# Patient Record
Sex: FEMALE | Race: OTHER | NOT HISPANIC OR LATINO | Employment: FULL TIME | URBAN - METROPOLITAN AREA
[De-identification: names, ages, dates, MRNs, and addresses within clinical notes are randomized per-mention and may not be internally consistent; named-entity substitution may affect disease eponyms.]

---

## 2021-10-13 ENCOUNTER — HISTORICAL (OUTPATIENT)
Dept: ADMINISTRATIVE | Facility: HOSPITAL | Age: 46
End: 2021-10-13

## 2021-11-09 ENCOUNTER — HISTORICAL (OUTPATIENT)
Dept: RADIOLOGY | Facility: HOSPITAL | Age: 46
End: 2021-11-09

## 2022-01-25 ENCOUNTER — HISTORICAL (OUTPATIENT)
Dept: ADMINISTRATIVE | Facility: HOSPITAL | Age: 47
End: 2022-01-25

## 2022-02-03 ENCOUNTER — HISTORICAL (OUTPATIENT)
Dept: SURGERY | Facility: HOSPITAL | Age: 47
End: 2022-02-03

## 2022-02-23 ENCOUNTER — HISTORICAL (OUTPATIENT)
Dept: HEMATOLOGY/ONCOLOGY | Facility: CLINIC | Age: 47
End: 2022-02-23

## 2022-04-10 ENCOUNTER — HISTORICAL (OUTPATIENT)
Dept: ADMINISTRATIVE | Facility: HOSPITAL | Age: 47
End: 2022-04-10
Payer: OTHER GOVERNMENT

## 2022-04-28 VITALS
HEIGHT: 68 IN | WEIGHT: 293 LBS | BODY MASS INDEX: 44.41 KG/M2 | DIASTOLIC BLOOD PRESSURE: 82 MMHG | SYSTOLIC BLOOD PRESSURE: 131 MMHG

## 2022-05-14 NOTE — OP NOTE
DATE OF SURGERY:    02/03/2022    SURGEON:  Ricki Sapp MD    PREOPERATIVE DIAGNOSIS:  Cubital tunnel syndrome, left elbow.    POSTOPERATIVE DIAGNOSIS:  Cubital tunnel syndrome, left elbow.    PROCEDURE:  Endoscopic cubital tunnel release, left elbow.    INDICATION FOR PROCEDURE:  Ms. Maharaj is a 46-year-old female with longstanding history of ulnar nerve compression, left elbow.  She presents for endoscopic release.    ANESTHESIA:  General.    COMPLICATIONS:  None.    PROCEDURE IN DETAIL:  The patient was placed under LMA anesthesia, prepped and draped in the usual sterile fashion.  A 4 cm incision was made using a 15-blade scalpel over the cubital tunnel of the left elbow near the medial epicondyle.  Dissection was carried out through the fat to the level of the fascia.  We used an endoscope for direct visualization of the release.  The proximal portion of the fascial attachments surrounding the ulnar nerve were released under direct endoscopic visualization.  The distal aspect was then visualized using the endoscope, and this was also released using tenotomy scissors.  We used tenotomy scissors to release all the residual attachments not using the endoscope using direct visualization.  After this was completed, the ulnar nerve was completely released.  Deep tissue was closed using 3-0 Vicryl suture, and the skin was closed using 3-0 Monocryl.  Tourniquet was released.  Fingers were pink at the end of the case.  I was present for key portions of the procedure.        ______________________________  Ricki Sapp MD    /US  DD:  02/03/2022  Time:  07:56AM  DT:  02/03/2022  Time:  08:05AM  Job #:  [id number]

## 2022-05-27 ENCOUNTER — APPOINTMENT (OUTPATIENT)
Dept: RADIOLOGY | Facility: HOSPITAL | Age: 47
End: 2022-05-27
Payer: OTHER GOVERNMENT

## 2022-05-27 DIAGNOSIS — Z91.89 AT HIGH RISK FOR BREAST CANCER: ICD-10-CM

## 2022-05-27 LAB
CREAT SERPL-MCNC: 0.9 MG/DL (ref 0.5–1.4)
SAMPLE: NORMAL

## 2022-05-27 PROCEDURE — 77049 MRI BREAST C-+ W/CAD BI: CPT | Mod: 26,,, | Performed by: STUDENT IN AN ORGANIZED HEALTH CARE EDUCATION/TRAINING PROGRAM

## 2022-05-27 PROCEDURE — 77049 MRI BREAST C-+ W/CAD BI: CPT | Mod: TC

## 2022-05-27 PROCEDURE — A9577 INJ MULTIHANCE: HCPCS

## 2022-05-27 PROCEDURE — 77049 MRI BREAST W/WO CONTRAST, W/CAD, BILATERAL: ICD-10-PCS | Mod: 26,,, | Performed by: STUDENT IN AN ORGANIZED HEALTH CARE EDUCATION/TRAINING PROGRAM

## 2022-05-27 PROCEDURE — 25500020 PHARM REV CODE 255

## 2022-05-27 RX ADMIN — GADOBENATE DIMEGLUMINE 20 ML: 529 INJECTION, SOLUTION INTRAVENOUS at 10:05

## 2022-06-20 NOTE — PROGRESS NOTES
[facility] [place] [facility]  Breast Surgical Oncology  New Patient Office Visit - H&P      Chief Complaint:   Chief Complaint   Patient presents with    Follow-up     MRI follow up        Subjective:      Treatments:  1. 2022 Genetic testing - Negative.    Interval History:   2022  She is doing well. On 3/13/2022, patient had genetic testing through [facility] and tested Negative: No clinically significant variant detected. There were also no varianats of uncertain significance detected. She currently denies any breast issues including rashes, redness, pain, swelling, nipple discharge, or new lumps/masses.    I also updated her Tyrer Cuzick score at today's visit and it is now 33% on v8 and 25.1% on v7. She states that she did find a OB/GYN in the interval through [facility] but unsure of the doctors name.     History of Present Illness:  HPI: Sanjeev Maharaj is a pleasant 46 Years old Female who presents for evaluation and assessment of risk for breast cancer based on the Tyrer-Cuzick Breast Cancer Risk Model (>20% indicates elevated lifetime risk). Her lifetime risk is calculated to be:33% on v8. . Patient went for her screening mammogram at [facility] and was found to be at high risk for breast cancer and is now here today. Her sister was diagnosed with breast cancer at age 38. She also recently had surgery for left arm cubital tunnel syndrome at [facility]. Reports possible right axillary excisional biopsy, she is not sure exactly what was done.    Imagin. 2021 SCR MG at [facility]: BENIGN.FINDINGS: No significant masses, calcifications, or other findings are seen in either breast. Prominent lymph nodes are again noted in the bilateral axillae, not significantly changed compared to the prior examinations. There has been no significant interval change. IMPRESSION: BENIGN There is no mammographic evidence of malignancy. RECOMMENDATIONS: A routine screening mammogram in one year  in the absence of significant clinical findings in the interval is recommended (November 2022).  2. 04/19/2022 BL Breast MRI at [facility]- Benign. No evidence of malignancy. Bilateral axillary adenopathy, not significantly changed compared to outside prior breast MRI's dating back to 09/06/2016 and outside prior mammograms dating back to 05/09/2017.  Clinical correlation with potential prior right axillary excisional lymph node biopsy would be helpful.  BI-RADS 2: Benign. Recommend continued annual screening mammography with tomosynthesis.  Next exam is due in November, 2022.    Pathology:   none    OB / GYN History   Menarche Onset: 12  Menopause: Post, at age: 33  Hormonal birth control (duration): 20years  Pregnancies: 2  Age at first child birth: 20  Child births: 1  Breastfeeding duration: 0_  Hysterectomy: yes, December 2010  Oophorectomy: no  HRT: no    Family History of Cancer (& age at diagnosis):  - Mother breast cancer at age 40, colon cancer at age 45  -Father bladder cancer at age 65  -Sister (Maternal) breast cancer at age 38  - Sister Breast Cancer at age 51    Lifestyle:  Smoker: Smoking Status  Never (less than 100 in lifetime)  Height and Weight: 5 feet 8 inches, 304 pounds   ETOH consumption: yes, one beer once a year    Other:  # of breast biopsies (when and pathology results): _  MG breast density: BIRADS C, heterogenously dense  Prior thoracic RT: none  Genetic testing: none  Ashkenazi Catholic descent: No      Other History:     Past Medical History:   Diagnosis Date    Anxiety     Depression     GERD (gastroesophageal reflux disease)     Hypertension         Past Surgical History:   Procedure Laterality Date    ADENOIDECTOMY      HYSTERECTOMY      TONSILLECTOMY          Social History     Socioeconomic History    Marital status:    Tobacco Use    Smoking status: Never Smoker    Smokeless tobacco: Never Used   Substance and Sexual Activity    Alcohol use: Not Currently     Comment:  Occasionally          There is no immunization history on file for this patient.     Medications/Allergies:    Current Outpatient Medications on File Prior to Visit   Medication Sig Dispense Refill    acetaminophen 325 mg Cap Take 325 mg by mouth.      amLODIPine (NORVASC) 10 MG tablet amlodipine 10 mg tablet      buPROPion (WELLBUTRIN XL) 150 MG TB24 tablet bupropion HCl  mg 24 hr tablet, extended release      cetirizine (ZYRTEC) 10 MG tablet Take 10 mg by mouth.      cyanocobalamin (VITAMIN B-12) 250 MCG tablet Take 250 mcg by mouth once daily.      ergocalciferol (VITAMIN D2) 50,000 unit Cap Take 50,000 Units by mouth every 7 days.      FLUoxetine 20 MG capsule Take 20 mg by mouth.      hydrOXYzine HCL (ATARAX) 25 MG tablet hydroxyzine HCl 25 mg tablet      losartan (COZAAR) 100 MG tablet losartan 100 mg tablet      omeprazole (PRILOSEC) 20 MG capsule omeprazole 20 mg capsule,delayed release      spironolactone (ALDACTONE) 25 MG tablet Take 25 mg by mouth.      valACYclovir (VALTREX) 500 MG tablet Take 500 mg by mouth.      ZOLMitriptan (ZOMIG) 5 MG tablet Take 5 mg by mouth.       No current facility-administered medications on file prior to visit.        Review of patient's allergies indicates:   Allergen Reactions    Hydrochlorothiazide     Lisinopril         Review of Systems:      Constitutional: denies fevers, chills, weight loss  HEENT: denies blurry/double vision, changes in hearing, odynophagia, dysphagia  Respiratory: denies cough, shortness of breath  Cardiovascular: denies palpitations, swelling of the extremities  GI: denies abdominal pain, nausea/vomiting, hematochezia, frequent stools  : denies frequency, dysuria, flank pain, hematuria  Skin: denies new rashes  Neurological: denies muscular/sensory deficiencies, loss of coordination, headaches, memory changes  Endo: denies hair loss/thinning, nervousness, hot flashes, heat/cold intolerance, lumps in the neck area  Heme:  denies easy bruising and fatigue  Psychological: denies anxious/depressive moods  Musculoskeletal: denies bony pain, muscle cramps, swollen joints       Objective/Physical Exam     Vitals:    06/23/22 1502   BP: 134/86   Pulse: 63   Resp: 18   Temp: 97.9 °F (36.6 °C)        General: The patient is awake, alert and oriented times three. The patient is well nourished and in no acute distress.  Neck: There is no evidence of palpable cervical, supraclavicular or axillary adenopathy. The neck is supple. The thyroid is not enlarged.  Musculoskeletal: The patient has a normal range of motion of her bilateral upper extremities.  Chest: Examination of the chest wall fails to reveal any obvious abnormalities. Nonlabored breathing, symmetric expansion.  Breast:  Right: Examination of right breast fails to reveal any dominant masses or areas of significant focal nodularity. The nipple is everted without evidence of discharge. There is no skin dimpling with movement of the pectoralis. There are no significant skin changes overlying the breast.   Left: Examination of the left breast fails to reveal any dominant masses or areas of significant focal nodularity. The nipple is everted without evidence of discharge. There is no skin dimpling with movement of the pectoralis. There are no significant skin changes overlying the breast.  Abdomen: The abdomen is soft, flat, nontender and nondistended.  Integumentary: no rashes or skin lesions present  Neurologic: cranial nerves intact, no signs of peripheral neurological deficit, motor/sensory function intact       Assessment and Plan     There is no problem list on file for this patient.       Sanjeev was seen today for follow-up.    Diagnoses and all orders for this visit:    At high risk for breast cancer    Family history of breast cancer    Screening mammogram for breast cancer      ---------------------------------------------------------------------------------------------------------------    PLAN:  1. Lifestyle - Healthy lifestyle guidelines were reviewed. She was encouraged to engage in regular exercise, maintain a healthy body weight, and avoid excessive alcohol consumption. Healthy nutritional guidelines were also discussed. Self-breast examination was reviewed with the patient in detail and she was encouraged to perform this on a monthly basis.    2. Surveillance - She desires undergoing high risk screening with annual SCR MGs and MRIs. In the absence of significant clinical findings in the interval, I recommend keeping  SCR MG in November of 2022 (order was placed at last visit) and a high risk screening MRI in April 2023. RTC in December 2022.  Patient recently saw her GYN in May and this will put her around 6 months between seeing her GYN and us for CBE.     3. Prevention - We had a brief discussion/education about indications for preventative mastectomy or chemoprevention. These methods are not recommended for her at this time.     4. Genetics - She had genetic testing done and it was negative.     5. Continue to see OB/GYN for CBE. Recommend two CBE a year. One with us and one with your OB/GYN Provider. We recommend them to be at a six month interval.      6. Please call our office with any questions or concerns that may arise before the next appointment.     All of her questions were answered.    BERHANE Sampson

## 2022-06-23 ENCOUNTER — OFFICE VISIT (OUTPATIENT)
Dept: SURGERY | Facility: CLINIC | Age: 47
End: 2022-06-23
Payer: OTHER GOVERNMENT

## 2022-06-23 VITALS
RESPIRATION RATE: 18 BRPM | BODY MASS INDEX: 44.41 KG/M2 | SYSTOLIC BLOOD PRESSURE: 134 MMHG | HEIGHT: 68 IN | OXYGEN SATURATION: 98 % | HEART RATE: 63 BPM | WEIGHT: 293 LBS | DIASTOLIC BLOOD PRESSURE: 86 MMHG | TEMPERATURE: 98 F

## 2022-06-23 DIAGNOSIS — Z80.3 FAMILY HISTORY OF BREAST CANCER: ICD-10-CM

## 2022-06-23 DIAGNOSIS — Z91.89 AT HIGH RISK FOR BREAST CANCER: Primary | ICD-10-CM

## 2022-06-23 DIAGNOSIS — Z12.31 SCREENING MAMMOGRAM FOR BREAST CANCER: ICD-10-CM

## 2022-06-23 PROCEDURE — 99215 OFFICE O/P EST HI 40 MIN: CPT | Mod: PBBFAC

## 2022-06-23 PROCEDURE — 99213 PR OFFICE/OUTPT VISIT, EST, LEVL III, 20-29 MIN: ICD-10-PCS | Mod: S$PBB,,,

## 2022-06-23 PROCEDURE — 99213 OFFICE O/P EST LOW 20 MIN: CPT | Mod: S$PBB,,,

## 2022-06-23 PROCEDURE — 99999 PR PBB SHADOW E&M-EST. PATIENT-LVL V: ICD-10-PCS | Mod: PBBFAC,,,

## 2022-06-23 PROCEDURE — 99999 PR PBB SHADOW E&M-EST. PATIENT-LVL V: CPT | Mod: PBBFAC,,,

## 2022-06-23 RX ORDER — LOSARTAN POTASSIUM 100 MG/1
TABLET ORAL
COMMUNITY
Start: 2021-11-24

## 2022-06-23 RX ORDER — VALACYCLOVIR HYDROCHLORIDE 500 MG/1
500 TABLET, FILM COATED ORAL
COMMUNITY
Start: 2021-11-24

## 2022-06-23 RX ORDER — HYDROXYZINE HYDROCHLORIDE 25 MG/1
TABLET, FILM COATED ORAL
COMMUNITY
Start: 2021-11-24

## 2022-06-23 RX ORDER — CYANOCOBALAMIN (VITAMIN B-12) 250 MCG
250 TABLET ORAL DAILY
COMMUNITY

## 2022-06-23 RX ORDER — FLUOXETINE HYDROCHLORIDE 20 MG/1
20 CAPSULE ORAL
COMMUNITY
Start: 2021-11-24

## 2022-06-23 RX ORDER — GUAIFENESIN 1200 MG
325 TABLET, EXTENDED RELEASE 12 HR ORAL
COMMUNITY
Start: 2021-11-24

## 2022-06-23 RX ORDER — CETIRIZINE HYDROCHLORIDE 10 MG/1
10 TABLET ORAL
COMMUNITY
Start: 2022-01-25

## 2022-06-23 RX ORDER — ERGOCALCIFEROL 1.25 MG/1
50000 CAPSULE ORAL
COMMUNITY

## 2022-06-23 RX ORDER — ZOLMITRIPTAN 5 MG/1
5 TABLET, FILM COATED ORAL
COMMUNITY
Start: 2021-11-24

## 2022-06-23 RX ORDER — SPIRONOLACTONE 25 MG/1
25 TABLET ORAL
COMMUNITY
Start: 2021-11-24

## 2022-06-23 RX ORDER — AMLODIPINE BESYLATE 10 MG/1
TABLET ORAL
COMMUNITY
Start: 2022-01-25

## 2022-06-23 RX ORDER — OMEPRAZOLE 20 MG/1
CAPSULE, DELAYED RELEASE ORAL
COMMUNITY
Start: 2021-11-24 | End: 2023-06-21

## 2022-06-23 RX ORDER — BUPROPION HYDROCHLORIDE 150 MG/1
TABLET ORAL
COMMUNITY

## 2022-09-19 DIAGNOSIS — R51.9 HEADACHE, UNSPECIFIED: Primary | ICD-10-CM

## 2022-10-04 ENCOUNTER — HOSPITAL ENCOUNTER (OUTPATIENT)
Dept: RADIOLOGY | Facility: HOSPITAL | Age: 47
Discharge: HOME | End: 2022-10-04
Attending: INTERNAL MEDICINE
Payer: OTHER GOVERNMENT

## 2022-10-04 DIAGNOSIS — R51.9 HEADACHE, UNSPECIFIED: ICD-10-CM

## 2022-10-04 PROCEDURE — 70450 CT HEAD/BRAIN W/O DYE: CPT | Mod: TC

## 2022-12-05 ENCOUNTER — TELEPHONE (OUTPATIENT)
Dept: SURGERY | Facility: CLINIC | Age: 47
End: 2022-12-05
Payer: OTHER GOVERNMENT

## 2022-12-05 NOTE — TELEPHONE ENCOUNTER
Called and spoke w/ patient to follow up on MG. Patient stated that the referral attached to MG order was  and needed update, however, it is currently up to date. I gave patient scheduling's number to call and r/s MG. Patient verbally understood. Will keep follow up appt w/ Joann Rubio NP.

## 2022-12-14 ENCOUNTER — HOSPITAL ENCOUNTER (OUTPATIENT)
Dept: RADIOLOGY | Facility: HOSPITAL | Age: 47
Discharge: HOME | End: 2022-12-14
Payer: OTHER GOVERNMENT

## 2022-12-14 DIAGNOSIS — Z12.31 BREAST CANCER SCREENING BY MAMMOGRAM: ICD-10-CM

## 2022-12-14 PROCEDURE — 77063 MAMMO DIGITAL SCREENING BILAT WITH TOMO: ICD-10-PCS | Mod: 26,,, | Performed by: RADIOLOGY

## 2022-12-14 PROCEDURE — 77063 BREAST TOMOSYNTHESIS BI: CPT | Mod: TC

## 2022-12-14 PROCEDURE — 77067 SCR MAMMO BI INCL CAD: CPT | Mod: 26,,, | Performed by: RADIOLOGY

## 2022-12-14 PROCEDURE — 77063 BREAST TOMOSYNTHESIS BI: CPT | Mod: 26,,, | Performed by: RADIOLOGY

## 2022-12-14 PROCEDURE — 77067 MAMMO DIGITAL SCREENING BILAT WITH TOMO: ICD-10-PCS | Mod: 26,,, | Performed by: RADIOLOGY

## 2023-04-04 DIAGNOSIS — M25.532 LEFT WRIST PAIN: Primary | ICD-10-CM

## 2023-05-19 DIAGNOSIS — M25.532 LEFT WRIST PAIN: Primary | ICD-10-CM

## 2023-05-26 ENCOUNTER — APPOINTMENT (OUTPATIENT)
Dept: RADIOLOGY | Facility: HOSPITAL | Age: 48
End: 2023-05-26
Payer: OTHER GOVERNMENT

## 2023-05-26 DIAGNOSIS — Z91.89 AT HIGH RISK FOR BREAST CANCER: ICD-10-CM

## 2023-05-26 DIAGNOSIS — Z80.3 FAMILY HISTORY OF BREAST CANCER: ICD-10-CM

## 2023-05-26 LAB
CREAT SERPL-MCNC: 1 MG/DL (ref 0.5–1.4)
SAMPLE: NORMAL

## 2023-05-26 PROCEDURE — A9577 INJ MULTIHANCE: HCPCS

## 2023-05-26 PROCEDURE — 77049 MRI BREAST C-+ W/CAD BI: CPT | Mod: TC

## 2023-05-26 PROCEDURE — 77049 MRI BREAST W/WO CONTRAST, W/CAD, BILATERAL: ICD-10-PCS | Mod: 26,,, | Performed by: STUDENT IN AN ORGANIZED HEALTH CARE EDUCATION/TRAINING PROGRAM

## 2023-05-26 PROCEDURE — 77049 MRI BREAST C-+ W/CAD BI: CPT | Mod: 26,,, | Performed by: STUDENT IN AN ORGANIZED HEALTH CARE EDUCATION/TRAINING PROGRAM

## 2023-05-26 PROCEDURE — 25500020 PHARM REV CODE 255

## 2023-05-26 RX ADMIN — GADOBENATE DIMEGLUMINE 20 ML: 529 INJECTION, SOLUTION INTRAVENOUS at 07:05

## 2023-05-29 ENCOUNTER — HOSPITAL ENCOUNTER (OUTPATIENT)
Dept: RADIOLOGY | Facility: HOSPITAL | Age: 48
Discharge: HOME | End: 2023-05-29
Attending: STUDENT IN AN ORGANIZED HEALTH CARE EDUCATION/TRAINING PROGRAM
Payer: OTHER GOVERNMENT

## 2023-05-29 ENCOUNTER — OFFICE VISIT (OUTPATIENT)
Dept: ORTHOPEDICS | Facility: CLINIC | Age: 48
End: 2023-05-29
Payer: OTHER GOVERNMENT

## 2023-05-29 VITALS
SYSTOLIC BLOOD PRESSURE: 140 MMHG | WEIGHT: 293 LBS | HEART RATE: 60 BPM | BODY MASS INDEX: 44.41 KG/M2 | HEIGHT: 68 IN | DIASTOLIC BLOOD PRESSURE: 88 MMHG

## 2023-05-29 DIAGNOSIS — M25.532 LEFT WRIST PAIN: ICD-10-CM

## 2023-05-29 DIAGNOSIS — R20.2 LEFT HAND PARESTHESIA: ICD-10-CM

## 2023-05-29 DIAGNOSIS — M25.532 LEFT WRIST PAIN: Primary | ICD-10-CM

## 2023-05-29 PROCEDURE — 99215 OFFICE O/P EST HI 40 MIN: CPT | Mod: PBBFAC

## 2023-05-29 PROCEDURE — 73110 X-RAY EXAM OF WRIST: CPT | Mod: TC,LT

## 2023-05-29 RX ORDER — BUSPIRONE HYDROCHLORIDE 10 MG/1
10 TABLET ORAL
COMMUNITY
Start: 2023-04-27

## 2023-05-29 RX ORDER — LANOLIN ALCOHOL/MO/W.PET/CERES
1000 CREAM (GRAM) TOPICAL
COMMUNITY
Start: 2022-11-30 | End: 2023-06-21 | Stop reason: SDUPTHER

## 2023-05-29 RX ORDER — MELOXICAM 15 MG/1
15 TABLET ORAL DAILY
Qty: 30 TABLET | Refills: 0 | Status: SHIPPED | OUTPATIENT
Start: 2023-05-29

## 2023-05-29 RX ORDER — LOSARTAN POTASSIUM 100 MG/1
100 TABLET ORAL
COMMUNITY
Start: 2023-01-03 | End: 2023-06-21 | Stop reason: SDUPTHER

## 2023-05-29 RX ORDER — ERGOCALCIFEROL 1.25 MG/1
1250 CAPSULE ORAL
COMMUNITY
Start: 2022-09-14 | End: 2023-06-21 | Stop reason: SDUPTHER

## 2023-05-29 RX ORDER — METRONIDAZOLE 500 MG/1
500 TABLET ORAL
COMMUNITY
Start: 2023-05-22 | End: 2023-06-21

## 2023-05-29 RX ORDER — VALACYCLOVIR HYDROCHLORIDE 500 MG/1
1 TABLET, FILM COATED ORAL 2 TIMES DAILY
COMMUNITY
Start: 2022-06-30 | End: 2023-06-21 | Stop reason: SDUPTHER

## 2023-05-29 RX ORDER — TOPIRAMATE 200 MG/1
100 TABLET ORAL
COMMUNITY
Start: 2023-03-30

## 2023-05-29 RX ORDER — DICLOFENAC SODIUM 10 MG/G
2 GEL TOPICAL 4 TIMES DAILY PRN
Qty: 100 G | Refills: 3 | Status: SHIPPED | OUTPATIENT
Start: 2023-05-29 | End: 2023-08-27

## 2023-05-29 RX ORDER — FLUOXETINE HYDROCHLORIDE 20 MG/1
80 CAPSULE ORAL
COMMUNITY
Start: 2023-01-03 | End: 2023-06-21 | Stop reason: SDUPTHER

## 2023-05-29 RX ORDER — BUPROPION HYDROCHLORIDE 150 MG/1
1 TABLET, EXTENDED RELEASE ORAL DAILY
COMMUNITY
Start: 2023-04-27 | End: 2023-06-21 | Stop reason: SDUPTHER

## 2023-05-29 RX ORDER — AMLODIPINE BESYLATE 10 MG/1
10 TABLET ORAL
COMMUNITY
Start: 2023-01-03 | End: 2023-06-21 | Stop reason: SDUPTHER

## 2023-05-29 RX ORDER — OMEPRAZOLE 20 MG/1
1 CAPSULE, DELAYED RELEASE ORAL DAILY
COMMUNITY
Start: 2023-05-01

## 2023-05-29 NOTE — PROGRESS NOTES
"Subjective:    Patient ID: Sanjeev Maharaj is a right handed 48 y.o. female  who presented to [facility] for new visit..    Chief Complaint: Pain of the Left Wrist    History of Present Illness:    Sanjeev Maharaj is a 48-year-old female who presents with complaints of left wrist pain.  She is been experiencing this pain for several years.  She localizes the pain to the volar aspect of her wrist towards the radial side.  Is near where she had a ganglion cyst removed approximately 17 years ago.  She describes the pain as a sharp pain that radiates through the base of her thumb when she hits it.  She would used Tylenol and ibuprofen without much relief.    Hand Review of Systems:  Swelling?  Yes  Instability?  No  Clicking?  No  Limited ROM? No  Fever/Chills? No  Subluxation? No  Dislocation? No  Numbness/Tingling? Yes  Weakness? Yes     Objective:      Physical Exam:    BP (!) 140/88   Pulse 60   Ht 5' 8" (1.727 m)   Wt (!) 137.3 kg (302 lb 9.6 oz)   BMI 46.01 kg/m²       Appearance:  Soft tissue swelling: Left: yes (localized volar wrist)  Right: no  Effusion: Left:  Negative Right: Negative  Erythema: Left no Right: no  Ecchymosis: Left: no Right: no  Atrophy: Left: no Right: no    Palpation:  Hand/wrist Tenderness: Left: volar wrist  Right: none    Range of motion:  Flexion (0-80): Left:  80 Right: 80  Extension (0-70): Left:  70 Right: 70  Ulnar deviation (0-30): 30 Right: 30  Radial deviation (0-20): 20 Right: 20  Supination (0-90): Left: 90 Right: 90  Pronation (0-90): Left: 90 Right: 90  Able to make a power fist and claw hand: on Both hand(s)  Distal palmar crease-finger tip distance: 0 on Both hand(s)    Strength:  Flexion: Left: 5/5 Pain: No Right: 5/5 Pain: No  Extension: Left: 5/5 Pain: No Right: 5/5 Pain: No  Supination: Left: 5/5 Pain: No Right: 5/5 Pain: No  Pronation: Left: 5/5 Pain: No Right: 5/5 Pain: No  Ulnar deviation: Left: 5/5 Pain: No Right: " 5/5 Pain: No  Radial deviation: Left: 5/5 Pain: No Right: 5/5 Pain: No    Special Tests:  Durkans Test (Carpal Compression test): Left: Negative  Right: Negative  Tinels:  Left: positive over radial wrist, not carpal tunnel  Right: Negative    Phalens: Left: Negative  Right: Negative    FDP test: Left: Negative  Right: Negative  FDS test: Left: Negative  Right: Negative  Reverse Phalens: Left: Negative Right: Negative  Finkelstein's Test: Left: Negative Right: Negative    Froments: Left: Negative Right: Negative    AIN/PIN/Radial nerve: Intact and symmetric    General appearance: NAD  Peripheral pulses: normal bilaterally   Sensation: normal    Labs:  Last A1c: 5.6     Imaging:   Previous images performed. Images not available. Impression read.  X-rays ordered and performed today: Yes  # of views: 3 Laterality: left  My Interpretation:  Distal Radial ulnar joint space is overall Normal on AP views. Scapholunate interval distance is Normal on left AP views. A DISI/VISI is not suggested on left hand series. Negative/positive ulnar variance is not suggested on lateral views.  no fracture, dislocation, swelling or degenerative changes noted      Assessment:      Encounter Diagnoses   Code Name Primary?    M25.532 Left wrist pain Yes    R20.2 Left hand paresthesia       Plan:      Orders Placed This Encounter   Procedures    X-Ray Wrist Complete Left     Standing Status:   Future     Number of Occurrences:   1     Standing Expiration Date:   5/29/2024     Order Specific Question:   May the Radiologist modify the order per protocol to meet the clinical needs of the patient?     Answer:   Yes     Order Specific Question:   Release to patient     Answer:   Immediate     Medications Ordered This Encounter   Medications    diclofenac sodium (VOLTAREN) 1 % Gel     Sig: Apply 2 g topically 4 (four) times daily as needed (pain). Do not exceed 32 grams/day: do not to exceed 8 grams/day/single joint of upper extremities; do not to  exceed 16 grams/day/single joint of lower extremities.  Please request refill of this medication from your PCP.     Dispense:  100 g     Refill:  3    meloxicam (MOBIC) 15 MG tablet     Sig: Take 1 tablet (15 mg total) by mouth once daily. Please request refill of this medication from your PCP.     Dispense:  30 tablet     Refill:  0     Please request refill of this medication from your PCP.       MDM: Prior external referring provider notes reviewed. Prior external referring provider studies reviewed.    Dx: left pain and paresthesias.  Possible etiology for this includes neuropathy of the palmar cutaneous branch of the median nerve or other nerve damage.    Treatment Plan: Discussed with patient diagnosis and treatment recommendations.   Natural history and expected course discussed. Questions answered.  Educational material distributed.  We will get an EMG/NCS to further evaluate possible nerve pathology. Patient advised to [facility] and have results sent to our clinic.  Reduction in offending activity.  Gentle ROM exercises.  Rest, ice, compression, and elevation (RICE) therapy.  Home physical therapy exercise handouts provided to patient.   formal PT script provided to patient. You may take this script to whichever physical therapist you would like to go to.   We will provide patient with carpal tunnel brace which should be worn as often as possible.  Over the counter NSAID and/or tylenol provided you do not have contraindications such as but not limited to liver or kidney disease or uncontrolled blood pressure. If you're doctors have told you to to not take them based on your health, do not take them.   Imaging: radiological studies ordered and independently reviewed; discussed with patient; pending radiologist interpretation.   Procedure: Discussed CSI as treatment option.  We will consider this in the future depending on EMG/NCS results and improvement with the above  conservative therapy.  Activity: Activity as tolerated  Therapy: Physical Therapy and Occupational Therapy  Medication: first line treatment with daily acetaminophen. Up to 1000 mg three times daily can be taken; medication precautions given., start meloxicam 15 mg daily; medication precautions given, and topical NSAIDs prescribed; medication precautions given. Please see your primary care physician for further refills.  RTC:  After EMG/NCS

## 2023-06-21 ENCOUNTER — OFFICE VISIT (OUTPATIENT)
Dept: SURGERY | Facility: CLINIC | Age: 48
End: 2023-06-21
Payer: OTHER GOVERNMENT

## 2023-06-21 VITALS
HEIGHT: 68 IN | SYSTOLIC BLOOD PRESSURE: 139 MMHG | BODY MASS INDEX: 44.41 KG/M2 | HEART RATE: 64 BPM | WEIGHT: 293 LBS | RESPIRATION RATE: 18 BRPM | DIASTOLIC BLOOD PRESSURE: 75 MMHG | OXYGEN SATURATION: 98 % | TEMPERATURE: 98 F

## 2023-06-21 DIAGNOSIS — Z80.3 FAMILY HISTORY OF BREAST CANCER: ICD-10-CM

## 2023-06-21 DIAGNOSIS — R92.30 DENSE BREAST TISSUE: ICD-10-CM

## 2023-06-21 DIAGNOSIS — Z91.89 AT HIGH RISK FOR BREAST CANCER: ICD-10-CM

## 2023-06-21 DIAGNOSIS — Z12.31 SCREENING MAMMOGRAM FOR HIGH-RISK PATIENT: Primary | ICD-10-CM

## 2023-06-21 PROCEDURE — 99999 PR PBB SHADOW E&M-EST. PATIENT-LVL V: CPT | Mod: PBBFAC,,, | Performed by: STUDENT IN AN ORGANIZED HEALTH CARE EDUCATION/TRAINING PROGRAM

## 2023-06-21 PROCEDURE — 99999 PR PBB SHADOW E&M-EST. PATIENT-LVL V: ICD-10-PCS | Mod: PBBFAC,,, | Performed by: STUDENT IN AN ORGANIZED HEALTH CARE EDUCATION/TRAINING PROGRAM

## 2023-06-21 PROCEDURE — 99215 OFFICE O/P EST HI 40 MIN: CPT | Mod: PBBFAC | Performed by: STUDENT IN AN ORGANIZED HEALTH CARE EDUCATION/TRAINING PROGRAM

## 2023-06-21 PROCEDURE — 99214 OFFICE O/P EST MOD 30 MIN: CPT | Mod: S$PBB,,, | Performed by: STUDENT IN AN ORGANIZED HEALTH CARE EDUCATION/TRAINING PROGRAM

## 2023-06-21 PROCEDURE — 99214 PR OFFICE/OUTPT VISIT, EST, LEVL IV, 30-39 MIN: ICD-10-PCS | Mod: S$PBB,,, | Performed by: STUDENT IN AN ORGANIZED HEALTH CARE EDUCATION/TRAINING PROGRAM

## 2023-06-21 NOTE — PROGRESS NOTES
[facility] [place] [facility]  Breast Surgical Oncology  New Patient Office Visit - H&P      Chief Complaint:   Chief Complaint   Patient presents with    Follow-up     Patient is present today for a year High Risk follow up. N        Subjective:      Treatments:  1. 2022 Genetic testing - Negative.    Interval History:   2023  She is doing well.  She currently denies any breast issues including rashes, redness, pain, swelling, nipple discharge, or new lumps/masses. She had her bilateral screening mammogram in  which did not show any abnormalities.  She had her MRI in May 2023 and did not show any suspicious findings either.  No changes in her family history.  She states she walks for exercise.    Her son is in his late 20s and is in the [employer] currently stationed in [place].    History of Present Illness:  HPI: Sanjeev Maharaj is a pleasant 46 Years old Female who presents for evaluation and assessment of risk for breast cancer based on the Tyrer-Cuzick Breast Cancer Risk Model (>20% indicates elevated lifetime risk). Her lifetime risk is calculated to be:33% on v8. . Patient [place] [facility] [place] and was found to be at high risk for breast cancer and is now here today. Her sister was diagnosed with breast cancer at age 38. She also recently had surgery for left arm cubital tunnel syndrome at [facility]. Reports possible right axillary excisional biopsy, she is not sure exactly what was done.    Imagin. 2021 SCR [place] [facility] [place]: BENIGN.FINDINGS: No significant masses, calcifications, or other findings are seen in either breast. Prominent lymph nodes are again noted in the bilateral axillae, not significantly changed compared to the prior examinations. There has been no significant interval change. IMPRESSION: BENIGN There is no mammographic evidence of malignancy. RECOMMENDATIONS: A routine screening mammogram in one year in the absence of significant  clinical findings in the interval is recommended (November 2022).  2. [place] [facility] [place] No evidence of malignancy. Bilateral axillary adenopathy, not significantly changed compared to outside prior breast MRI's dating back to 09/06/2016 and outside prior mammograms dating back to 05/09/2017.  Clinical correlation with potential prior right axillary excisional lymph node biopsy would be helpful.  BI-RADS 2: Benign. Recommend continued annual screening mammography with tomosynthesis.    3. Bilateral screening mammogram 12/14/2022: Density C, no suspicious findings.  BI-RADS 2.  4. Breast MRI 5/26/23: no evidence of malignancy. Long term stable axillary adenopathy is benign. BIRADS2    I have reviewed the imaging and agree with the radiologists interpretation. I have discussed these results with the patient.      Pathology:   none    OB / GYN History   Menarche Onset: 12  Menopause: Post, at age: 33  Hormonal birth control (duration): 20years  Pregnancies: 2  Age at first child birth: 20  Child births: 1  Breastfeeding duration: 0_  Hysterectomy: yes, December 2010  Oophorectomy: no  HRT: no    Family History of Cancer (& age at diagnosis):  - Mother breast cancer at age 40, colon cancer at age 45  -Father bladder cancer at age 65  -Sister (Maternal) breast cancer at age 38  - Sister Breast Cancer at age 51    Lifestyle:  Smoker: Smoking Status  Never (less than 100 in lifetime)  Height and Weight: 5 feet 8 inches, 304 pounds   ETOH consumption: yes, one beer once a year    Other:  # of breast biopsies (when and pathology results): _  MG breast density: BIRADS C, heterogenously dense  Prior thoracic RT: none  Genetic testing: none  Ashkenazi Mormonism descent: No      Other History:     Past Medical History:   Diagnosis Date    Anxiety     Depression     GERD (gastroesophageal reflux disease)     Hypertension         Past Surgical History:   Procedure Laterality Date    ADENOIDECTOMY       HYSTERECTOMY      TONSILLECTOMY          Social History     Socioeconomic History    Marital status:    Tobacco Use    Smoking status: Never    Smokeless tobacco: Never   Substance and Sexual Activity    Alcohol use: Not Currently     Comment: Occasionally    Drug use: Not Currently    Sexual activity: Not Currently     Partners: Male     Birth control/protection: Post-menopausal          There is no immunization history on file for this patient.     Medications/Allergies:    Current Outpatient Medications on File Prior to Visit   Medication Sig Dispense Refill    acetaminophen 325 mg Cap Take 325 mg by mouth.      amLODIPine (NORVASC) 10 MG tablet amlodipine 10 mg tablet      buPROPion (WELLBUTRIN XL) 150 MG TB24 tablet bupropion HCl  mg 24 hr tablet, extended release      busPIRone (BUSPAR) 10 MG tablet 10 mg.      cetirizine (ZYRTEC) 10 MG tablet Take 10 mg by mouth.      cyanocobalamin (VITAMIN B-12) 250 MCG tablet Take 250 mcg by mouth once daily.      diclofenac sodium (VOLTAREN) 1 % Gel Apply 2 g topically 4 (four) times daily as needed (pain). Do not exceed 32 grams/day: do not to exceed 8 grams/day/single joint of upper extremities; do not to exceed 16 grams/day/single joint of lower extremities.  Please request refill of this medication from your PCP. 100 g 3    ergocalciferol (ERGOCALCIFEROL) 50,000 unit Cap Take 50,000 Units by mouth every 7 days.      FLUoxetine 20 MG capsule Take 20 mg by mouth.      hydrOXYzine HCL (ATARAX) 25 MG tablet hydroxyzine HCl 25 mg tablet      losartan (COZAAR) 100 MG tablet losartan 100 mg tablet      meloxicam (MOBIC) 15 MG tablet Take 1 tablet (15 mg total) by mouth once daily. Please request refill of this medication from your PCP. 30 tablet 0    metroNIDAZOLE (FLAGYL) 500 MG tablet 500 mg.      omeprazole (PRILOSEC) 20 MG capsule Take 1 capsule by mouth once daily.      spironolactone (ALDACTONE) 25 MG tablet Take 25 mg by mouth.      topiramate (TOPAMAX)  200 MG Tab 100 mg.      valACYclovir (VALTREX) 500 MG tablet Take 500 mg by mouth.      ZOLMitriptan (ZOMIG) 5 MG tablet Take 5 mg by mouth.      amLODIPine (NORVASC) 10 MG tablet 10 mg.      buPROPion (WELLBUTRIN SR) 150 MG TBSR 12 hr tablet Take 1 tablet by mouth once daily.      cyanocobalamin (VITAMIN B-12) 1000 MCG tablet 1,000 mcg.      ergocalciferol (ERGOCALCIFEROL) 50,000 unit Cap 1,250 mcg.      FLUoxetine 20 MG capsule 80 mg.      losartan (COZAAR) 100 MG tablet 100 mg.      omeprazole (PRILOSEC) 20 MG capsule omeprazole 20 mg capsule,delayed release      valACYclovir (VALTREX) 500 MG tablet Take 1 tablet by mouth 2 (two) times daily.       No current facility-administered medications on file prior to visit.        Review of patient's allergies indicates:   Allergen Reactions    Hydrochlorothiazide     Lisinopril         Review of Systems:      Constitutional: denies fevers, chills, weight loss  HEENT: denies blurry/double vision, changes in hearing, odynophagia, dysphagia  Respiratory: denies cough, shortness of breath  Cardiovascular: denies palpitations, swelling of the extremities  GI: denies abdominal pain, nausea/vomiting, hematochezia, frequent stools  : denies frequency, dysuria, flank pain, hematuria  Skin: denies new rashes  Neurological: denies muscular/sensory deficiencies, loss of coordination, headaches, memory changes  Endo: denies hair loss/thinning, nervousness, hot flashes, heat/cold intolerance, lumps in the neck area  Heme: denies easy bruising and fatigue  Psychological: denies anxious/depressive moods  Musculoskeletal: denies bony pain, muscle cramps, swollen joints       Objective/Physical Exam     Vitals:    06/21/23 1532   BP: 139/75   Pulse: 64   Resp: 18   Temp: 98.1 °F (36.7 °C)          General: The patient is awake, alert and oriented times three. The patient is well nourished and in no acute distress.  Neck: There is no evidence of palpable cervical, supraclavicular or  axillary adenopathy. The neck is supple. The thyroid is not enlarged.  Musculoskeletal: The patient has a normal range of motion of her bilateral upper extremities.  Chest: Examination of the chest wall fails to reveal any obvious abnormalities. Nonlabored breathing, symmetric expansion.  Breast:  Right: Examination of right breast fails to reveal any dominant masses or areas of significant focal nodularity. The nipple is everted without evidence of discharge. There is no skin dimpling with movement of the pectoralis. There are no significant skin changes overlying the breast.   Left: Examination of the left breast fails to reveal any dominant masses or areas of significant focal nodularity. The nipple is everted without evidence of discharge. There is no skin dimpling with movement of the pectoralis. There are no significant skin changes overlying the breast.         Assessment and Plan     Sanjeev was seen today for follow-up.    Diagnoses and all orders for this visit:    At high risk for breast cancer    Family history of breast cancer    Dense breast tissue       ---------------------------------------------------------------------------------------------------------------  Sanjeev Maharaj is a 48 y.o.female who is here today for high risk follow up.  There are no concerning findings on her breast exam or on recent imaging.  Today Sanjeev Maharaj was plugged into the Mastery of Breast Surgery risk calculator and her calculated risk of breast cancer based on Tyrer - Cuzick Breast Cancer Risk Model v8 was 41%.  She understands that >20% is considered high risk.  Today we again discussed risk factors associated with the development of breast cancer and risk reduction strategies.       PLAN:  1. Lifestyle - Healthy lifestyle guidelines were reviewed. She was encouraged to engage in regular exercise, maintain a healthy body weight, and avoid excessive alcohol consumption. Healthy nutritional guidelines were also  discussed. Self-breast examination was reviewed with the patient in detail and she was encouraged to perform this on a monthly basis.    2. Surveillance - She desires undergoing high risk screening with annual SCR MGs and MRIs. In the absence of significant clinical findings in the interval, screening mammogram is due December 14, 2023.  Next bilateral breast MRI is due May 26, 2023.  She wants to continue coming here yearly for her breast exam.    3. Genetics - She had genetic testing done and it was negative.     4. Recommend two CBE a year. One with us and one with your OB/GYN Provider. We recommend them to be at a six month interval.      The patient was encouraged to continue her self breast exam. If she notes any changes or has any concerns with her breast in the interim she was encouraged to call the breast center to schedule an appointment as soon as possible.  All questions were answered.      Kera Matthews MD    I spent a total of 30 minutes on the day of the visit.  This includes face to face time and non-face to face time preparing to see the patient (eg, review of tests), obtaining and/or reviewing separately obtained history, documenting clinical information in the electronic or other health record, independently interpreting results and communicating results to the patient/family/caregiver, or care coordinator.

## 2023-12-22 ENCOUNTER — HOSPITAL ENCOUNTER (OUTPATIENT)
Dept: RADIOLOGY | Facility: HOSPITAL | Age: 48
Discharge: HOME | End: 2023-12-22
Attending: STUDENT IN AN ORGANIZED HEALTH CARE EDUCATION/TRAINING PROGRAM
Payer: OTHER GOVERNMENT

## 2023-12-22 DIAGNOSIS — Z12.31 SCREENING MAMMOGRAM FOR HIGH-RISK PATIENT: ICD-10-CM

## 2023-12-22 PROCEDURE — 77063 MAMMO DIGITAL SCREENING BILAT WITH TOMO: ICD-10-PCS | Mod: 26,,, | Performed by: RADIOLOGY

## 2023-12-22 PROCEDURE — 77063 BREAST TOMOSYNTHESIS BI: CPT | Mod: 26,,, | Performed by: RADIOLOGY

## 2023-12-22 PROCEDURE — 77067 SCR MAMMO BI INCL CAD: CPT | Mod: TC

## 2023-12-22 PROCEDURE — 77067 SCR MAMMO BI INCL CAD: CPT | Mod: 26,,, | Performed by: RADIOLOGY

## 2023-12-22 PROCEDURE — 77067 MAMMO DIGITAL SCREENING BILAT WITH TOMO: ICD-10-PCS | Mod: 26,,, | Performed by: RADIOLOGY

## 2024-05-24 ENCOUNTER — APPOINTMENT (OUTPATIENT)
Dept: RADIOLOGY | Facility: HOSPITAL | Age: 49
End: 2024-05-24
Attending: STUDENT IN AN ORGANIZED HEALTH CARE EDUCATION/TRAINING PROGRAM
Payer: OTHER GOVERNMENT

## 2024-05-24 DIAGNOSIS — R92.30 DENSE BREAST TISSUE: ICD-10-CM

## 2024-05-24 DIAGNOSIS — Z91.89 AT HIGH RISK FOR BREAST CANCER: ICD-10-CM

## 2024-05-24 DIAGNOSIS — Z80.3 FAMILY HISTORY OF BREAST CANCER: ICD-10-CM

## 2024-05-24 PROCEDURE — 77049 MRI BREAST C-+ W/CAD BI: CPT | Mod: TC

## 2024-05-24 PROCEDURE — 25500020 PHARM REV CODE 255: Performed by: STUDENT IN AN ORGANIZED HEALTH CARE EDUCATION/TRAINING PROGRAM

## 2024-05-24 PROCEDURE — 77049 MRI BREAST C-+ W/CAD BI: CPT | Mod: 26,,, | Performed by: RADIOLOGY

## 2024-05-24 PROCEDURE — A9577 INJ MULTIHANCE: HCPCS | Performed by: STUDENT IN AN ORGANIZED HEALTH CARE EDUCATION/TRAINING PROGRAM

## 2024-05-24 RX ADMIN — GADOBENATE DIMEGLUMINE 20 ML: 529 INJECTION, SOLUTION INTRAVENOUS at 09:05

## 2024-06-20 ENCOUNTER — OFFICE VISIT (OUTPATIENT)
Dept: SURGERY | Facility: CLINIC | Age: 49
End: 2024-06-20
Payer: OTHER GOVERNMENT

## 2024-06-20 VITALS
SYSTOLIC BLOOD PRESSURE: 129 MMHG | HEIGHT: 68 IN | HEART RATE: 58 BPM | TEMPERATURE: 98 F | OXYGEN SATURATION: 99 % | BODY MASS INDEX: 41.52 KG/M2 | RESPIRATION RATE: 16 BRPM | DIASTOLIC BLOOD PRESSURE: 75 MMHG | WEIGHT: 274 LBS

## 2024-06-20 DIAGNOSIS — Z12.31 SCREENING MAMMOGRAM FOR BREAST CANCER: ICD-10-CM

## 2024-06-20 DIAGNOSIS — Z80.3 FAMILY HISTORY OF BREAST CANCER: ICD-10-CM

## 2024-06-20 DIAGNOSIS — R92.30 DENSE BREAST TISSUE: ICD-10-CM

## 2024-06-20 DIAGNOSIS — Z91.89 AT HIGH RISK FOR BREAST CANCER: Primary | ICD-10-CM

## 2024-06-20 PROCEDURE — 99214 OFFICE O/P EST MOD 30 MIN: CPT | Mod: S$PBB,,, | Performed by: STUDENT IN AN ORGANIZED HEALTH CARE EDUCATION/TRAINING PROGRAM

## 2024-06-20 PROCEDURE — 99215 OFFICE O/P EST HI 40 MIN: CPT | Mod: PBBFAC | Performed by: STUDENT IN AN ORGANIZED HEALTH CARE EDUCATION/TRAINING PROGRAM

## 2024-06-20 PROCEDURE — 99999 PR PBB SHADOW E&M-EST. PATIENT-LVL V: CPT | Mod: PBBFAC,,, | Performed by: STUDENT IN AN ORGANIZED HEALTH CARE EDUCATION/TRAINING PROGRAM

## 2024-06-20 RX ORDER — CARVEDILOL 6.25 MG/1
0.5 TABLET ORAL 2 TIMES DAILY
COMMUNITY
Start: 2024-02-26 | End: 2025-02-26

## 2024-06-20 RX ORDER — AMITRIPTYLINE HYDROCHLORIDE 25 MG/1
2 TABLET, FILM COATED ORAL NIGHTLY
COMMUNITY
Start: 2023-09-21

## 2024-06-20 RX ORDER — SUMATRIPTAN SUCCINATE 100 MG/1
100 TABLET ORAL
COMMUNITY
Start: 2023-08-29

## 2024-06-20 NOTE — PROGRESS NOTES
[facility] [place] [facility]  Breast Surgical Oncology  Est Patient Office Visit - H&P      Chief Complaint:   Chief Complaint   Patient presents with    Follow-up     Patient reports no breast related concerns         Subjective:      Treatments:  1. 2022 Genetic testing - Negative.    Interval History:   2024  Sanjeev Maharaj is a 49 y.o.female here for high risk followup.   She is doing well.  She currently denies any breast issues including redness, pain, swelling, nipple discharge, or new lumps/masses.  She does get rashes in her inframammary fold bilaterally.  She had her bilateral screening mammogram in 2023 and MRI in May 2024 which did not show any abnormalities.  No changes in her family history.  She continues to walk for exercise.    Her son is in the [employer] and still stationed in [place] but is moving to [place] in the next few months.  She plans to move to [place] at some point.    History of Present Illness:  HPI: Sanjeev Maharaj is a pleasant 46 Years old Female who presents for evaluation and assessment of risk for breast cancer based on the Tyrer-Cuzick Breast Cancer Risk Model (>20% indicates elevated lifetime risk). Her lifetime risk is calculated to be:33% on v8. . Patient went for her screening mammogram at [facility] [place] and was found to be at high risk for breast cancer and is now here today. Her sister was diagnosed with breast cancer at age 38. She also recently had surgery for left arm cubital tunnel syndrome at [facility]. Reports possible right axillary excisional biopsy, she is not sure exactly what was done.    Imagin. 2021 SCR MG at [facility] [place]: BENIGN.FINDINGS: No significant masses, calcifications, or other findings are seen in either breast. Prominent lymph nodes are again noted in the bilateral axillae, not significantly changed compared to the prior examinations. There has been no significant interval change. IMPRESSION: BENIGN  There is no mammographic evidence of malignancy. RECOMMENDATIONS: A routine screening mammogram in one year in the absence of significant clinical findings in the interval is recommended (November 2022).  2. 04/19/2022 BL Breast MRI at [facility] [place] Benign. No evidence of malignancy. Bilateral axillary adenopathy, not significantly changed compared to outside prior breast MRI's dating back to 09/06/2016 and outside prior mammograms dating back to 05/09/2017.  Clinical correlation with potential prior right axillary excisional lymph node biopsy would be helpful.  BI-RADS 2: Benign. Recommend continued annual screening mammography with tomosynthesis.    3. Bilateral screening mammogram 12/14/2022: Density C, no suspicious findings.  BI-RADS 2.  4. Breast MRI 5/26/23: no evidence of malignancy. Long term stable axillary adenopathy is benign. BIRADS2  5. Bilateral screening mammogram 12/22/2023: Density C.  Multiple oval masses bilateral breasts consistent with cysts are benign lesions relatively unchanged from prior exam.  Bilateral axillary adenopathy is stable.  BI-RADS 2.    6. MRI breast 05/24/2024:  No suspicious enhancement in either breast.  Long-term stable bilateral axillary adenopathy.    I have reviewed the imaging and agree with the radiologists interpretation. I have discussed these results with the patient.    Pathology:   none    OB / GYN History   Menarche Onset: 12  Menopause: Post, at age: 33  Hormonal birth control (duration): 20years  Pregnancies: 2  Age at first child birth: 20  Child births: 1  Breastfeeding duration: 0_  Hysterectomy: yes, December 2010  Oophorectomy: no  HRT: no    Family History of Cancer (& age at diagnosis):  - Mother breast cancer at age 40, colon cancer at age 45  -Father bladder cancer at age 65  -Sister (Maternal) breast cancer at age 38  - Sister Breast Cancer at age 51    Lifestyle:  Smoker: Smoking Status  Never (less than 100 in lifetime)  Height and Weight: 5 feet 8 inches,  304 pounds   ETOH consumption: yes, one beer once a year    Other:  # of breast biopsies (when and pathology results): _  MG breast density: BIRADS C, heterogenously dense  Prior thoracic RT: none  Genetic testing: none  Ashkenazi Congregation descent: No      Other History:     Past Medical History:   Diagnosis Date    Anxiety     Depression     Fibroid     Genital warts     GERD (gastroesophageal reflux disease)     Hypertension     Menopause         Past Surgical History:   Procedure Laterality Date    ADENOIDECTOMY      COLPOSCOPY      HYSTERECTOMY      TONSILLECTOMY          Social History     Socioeconomic History    Marital status:    Tobacco Use    Smoking status: Never    Smokeless tobacco: Never   Substance and Sexual Activity    Alcohol use: Not Currently     Comment: Occasionally    Drug use: Not Currently     Types: Marijuana    Sexual activity: Not Currently     Partners: Male     Birth control/protection: Post-menopausal        Immunization History   Administered Date(s) Administered    COVID-19, MRNA, LN-S, PF (MODERNA FULL 0.5 ML DOSE) 03/16/2021, 04/13/2021    COVID-19, MRNA, LN-S, PF (Pfizer) (Purple Cap) 12/08/2021    COVID-19, mRNA, LNP-S, PF (Moderna 2023)Ages 12+ 02/02/2024        Medications/Allergies:    Current Outpatient Medications on File Prior to Visit   Medication Sig Dispense Refill    acetaminophen 325 mg Cap Take 325 mg by mouth.      amitriptyline (ELAVIL) 25 MG tablet Take 2 tablets by mouth every evening.      amLODIPine (NORVASC) 10 MG tablet amlodipine 10 mg tablet      buPROPion (WELLBUTRIN XL) 150 MG TB24 tablet bupropion HCl  mg 24 hr tablet, extended release      busPIRone (BUSPAR) 10 MG tablet 10 mg.      carvediloL (COREG) 6.25 MG tablet Take 0.5 tablets by mouth 2 (two) times daily.      cetirizine (ZYRTEC) 10 MG tablet Take 10 mg by mouth.      cyanocobalamin (VITAMIN B-12) 250 MCG tablet Take 250 mcg by mouth once daily.      ergocalciferol (ERGOCALCIFEROL)  50,000 unit Cap Take 50,000 Units by mouth every 7 days.      FLUoxetine 20 MG capsule Take 20 mg by mouth.      hydrOXYzine HCL (ATARAX) 25 MG tablet hydroxyzine HCl 25 mg tablet      losartan (COZAAR) 100 MG tablet losartan 100 mg tablet      meloxicam (MOBIC) 15 MG tablet Take 1 tablet (15 mg total) by mouth once daily. Please request refill of this medication from your PCP. 30 tablet 0    omeprazole (PRILOSEC) 20 MG capsule Take 1 capsule by mouth once daily.      spironolactone (ALDACTONE) 25 MG tablet Take 25 mg by mouth.      sumatriptan (IMITREX) 100 MG tablet Take 100 mg by mouth.      topiramate (TOPAMAX) 200 MG Tab 100 mg.      valACYclovir (VALTREX) 500 MG tablet Take 500 mg by mouth.      ZOLMitriptan (ZOMIG) 5 MG tablet Take 5 mg by mouth.      diclofenac sodium (VOLTAREN) 1 % Gel Apply 2 g topically 4 (four) times daily as needed (pain). Do not exceed 32 grams/day: do not to exceed 8 grams/day/single joint of upper extremities; do not to exceed 16 grams/day/single joint of lower extremities.  Please request refill of this medication from your PCP. (Patient not taking: Reported on 6/20/2024) 100 g 3     No current facility-administered medications on file prior to visit.        Review of patient's allergies indicates:   Allergen Reactions    Hydrochlorothiazide     Lisinopril         Review of Systems:      See HPI for pertinent ROS.       Objective/Physical Exam     Vitals:    06/20/24 1534   BP: 129/75   Pulse: (!) 58   Resp: 16   Temp: 97.9 °F (36.6 °C)          PHYSICAL EXAM:   General: The patient is awake, alert and oriented times three. The patient is well nourished. No acute distress.  Neck: The neck is supple.   Musculoskeletal: The patient has a normal range of motion of her bilateral upper extremities.  Heart: Regular rate and rhythm, no murmurs.   Lung: No increased work of breathing. Clear breath sounds bilaterally.  Lymph nodes: There is no axillary, supraclavicular or cervical  lymphadenopathy.  Well-healed incision in the right axilla.  Breast:  Right:   On inspection there is no skin dimpling, rashes, retraction, erythema or skin abnormalities. The nipple areolar complex is normal with an everted nipple. The breasts move normally with movement of the pectoralis muscle. On palpation there are no dominant masses.  There is no tenderness. There is no nipple discharge.  Left:   On inspection there is no skin dimpling, rashes, retraction, erythema or skin abnormalities. The nipple areolar complex is normal with an everted nipple. The breasts move normally with movement of the pectoralis muscle. On palpation there are no dominant masses.  There is no tenderness. There is no nipple discharge.           Assessment and Plan     Sanjeev was seen today for follow-up.    Diagnoses and all orders for this visit:    At high risk for breast cancer  -     MRI Screening Breast W/WO Contrast, W/CAD, Jesus; Future    Family history of breast cancer  -     MRI Screening Breast W/WO Contrast, W/CAD, Jesus; Future    Dense breast tissue  -     MRI Screening Breast W/WO Contrast, W/CAD, Jesus; Future    Screening mammogram for breast cancer  -     Mammo Digital Screening Bilat w/ Zach; Future         ---------------------------------------------------------------------------------------------------------------  Sanjeev Maharaj is a 49 y.o.female who is here today for high risk follow up.  There are no concerning findings on her breast exam or on recent imaging.  Today Sanjeev Maharaj was plugged into the Mastery of Breast Surgery risk calculator and her calculated risk of breast cancer based on Tyrer - Cuzick Breast Cancer Risk Model v8 was 41%.  She understands that >20% is considered high risk.  Today we again discussed risk factors associated with the development of breast cancer and risk reduction strategies.       PLAN:  1. Lifestyle - Healthy lifestyle guidelines were reviewed. She was encouraged to  engage in regular exercise, maintain a healthy body weight, and avoid excessive alcohol consumption. Healthy nutritional guidelines were also discussed.     2. Surveillance - She desires undergoing high risk screening with annual SCR MGs and MRIs. In the absence of significant clinical findings in the interval, screening mammogram is due December 2024.  Next bilateral breast MRI is due a 2025.  These were both ordered today.    3. Recommend two CBE a year. One with the breast center and one with your OB/GYN Provider.     The patient was encouraged to continue her self breast exam. If she notes any changes or has any concerns with her breast in the interim she was encouraged to call the breast center to schedule an appointment as soon as possible.  All questions were answered.      Kera AKINS I spent a total of 30 minutes on the day of the visit.  This includes face to face time and non-face to face time preparing to see the patient (eg, review of tests), obtaining and/or reviewing separately obtained history, documenting clinical information in the electronic or other health record, independently interpreting results and communicating results to the patient/family/caregiver, or care coordinator.

## 2024-12-30 ENCOUNTER — HOSPITAL ENCOUNTER (OUTPATIENT)
Dept: RADIOLOGY | Facility: HOSPITAL | Age: 49
Discharge: HOME | End: 2024-12-30
Attending: STUDENT IN AN ORGANIZED HEALTH CARE EDUCATION/TRAINING PROGRAM
Payer: OTHER GOVERNMENT

## 2024-12-30 DIAGNOSIS — Z12.31 SCREENING MAMMOGRAM FOR BREAST CANCER: ICD-10-CM

## 2024-12-30 PROCEDURE — 77063 BREAST TOMOSYNTHESIS BI: CPT | Mod: TC

## 2025-06-17 DIAGNOSIS — R92.30 DENSE BREAST TISSUE: ICD-10-CM

## 2025-06-17 DIAGNOSIS — Z91.89 AT HIGH RISK FOR BREAST CANCER: Primary | ICD-10-CM

## 2025-06-17 DIAGNOSIS — Z12.39 OTHER SCREENING BREAST EXAMINATION: ICD-10-CM

## 2025-06-17 DIAGNOSIS — Z80.3 FAMILY HISTORY OF BREAST CANCER: ICD-10-CM

## 2025-07-11 ENCOUNTER — APPOINTMENT (OUTPATIENT)
Dept: RADIOLOGY | Facility: HOSPITAL | Age: 50
End: 2025-07-11
Attending: STUDENT IN AN ORGANIZED HEALTH CARE EDUCATION/TRAINING PROGRAM
Payer: OTHER GOVERNMENT

## 2025-07-11 DIAGNOSIS — Z80.3 FAMILY HISTORY OF BREAST CANCER: ICD-10-CM

## 2025-07-11 DIAGNOSIS — Z12.39 OTHER SCREENING BREAST EXAMINATION: ICD-10-CM

## 2025-07-11 DIAGNOSIS — R92.30 DENSE BREAST TISSUE: ICD-10-CM

## 2025-07-11 DIAGNOSIS — Z91.89 AT HIGH RISK FOR BREAST CANCER: ICD-10-CM

## 2025-07-11 PROCEDURE — 25500020 PHARM REV CODE 255: Performed by: STUDENT IN AN ORGANIZED HEALTH CARE EDUCATION/TRAINING PROGRAM

## 2025-07-11 PROCEDURE — 77049 MRI BREAST C-+ W/CAD BI: CPT | Mod: 26,,, | Performed by: RADIOLOGY

## 2025-07-11 PROCEDURE — 77049 MRI BREAST C-+ W/CAD BI: CPT | Mod: TC

## 2025-07-11 PROCEDURE — A9577 INJ MULTIHANCE: HCPCS | Performed by: STUDENT IN AN ORGANIZED HEALTH CARE EDUCATION/TRAINING PROGRAM

## 2025-07-11 RX ADMIN — GADOBENATE DIMEGLUMINE 20 ML: 529 INJECTION, SOLUTION INTRAVENOUS at 09:07
